# Patient Record
Sex: FEMALE | Race: WHITE | NOT HISPANIC OR LATINO | URBAN - METROPOLITAN AREA
[De-identification: names, ages, dates, MRNs, and addresses within clinical notes are randomized per-mention and may not be internally consistent; named-entity substitution may affect disease eponyms.]

---

## 2020-08-06 ENCOUNTER — NURSE TRIAGE (OUTPATIENT)
Dept: OTHER | Facility: OTHER | Age: 30
End: 2020-08-06

## 2020-08-06 DIAGNOSIS — Z20.822 ENCOUNTER FOR LABORATORY TESTING FOR SEVERE ACUTE RESPIRATORY SYNDROME CORONAVIRUS 2 (SARS-COV-2): Primary | ICD-10-CM

## 2020-08-06 DIAGNOSIS — Z20.822 ENCOUNTER FOR LABORATORY TESTING FOR SEVERE ACUTE RESPIRATORY SYNDROME CORONAVIRUS 2 (SARS-COV-2): ICD-10-CM

## 2020-08-06 PROCEDURE — U0003 INFECTIOUS AGENT DETECTION BY NUCLEIC ACID (DNA OR RNA); SEVERE ACUTE RESPIRATORY SYNDROME CORONAVIRUS 2 (SARS-COV-2) (CORONAVIRUS DISEASE [COVID-19]), AMPLIFIED PROBE TECHNIQUE, MAKING USE OF HIGH THROUGHPUT TECHNOLOGIES AS DESCRIBED BY CMS-2020-01-R: HCPCS | Performed by: FAMILY MEDICINE

## 2020-08-06 NOTE — TELEPHONE ENCOUNTER
Regarding: Covid concerns  ----- Message from Braden Mathew sent at 8/6/2020  9:53 AM EDT -----  " Patient is experiencing a low grade temp and a headache (no pcp)   "

## 2020-08-06 NOTE — TELEPHONE ENCOUNTER
Reason for Disposition   [1] COVID-19 infection suspected by caller or triager AND [2] mild symptoms (cough, fever, or others) AND [8] no complications or SOB    Additional Information   [1] Symptoms of COVID-19 (e g , cough, fever, SOB, or others) AND [2] lives in an area with community spread    Answer Assessment - Initial Assessment Questions  1  CLOSE CONTACT: "Who is the person with the confirmed or suspected COVID-19 infection that you were exposed to?"      Denied known exposure to COVID-19   6  TRAVEL: "Have you traveled out of the country recently?" If so, "When and where?"      * Also ask about out-of-state travel, since [facility] identified some high-risk cities for community spread in the [address]  * Note: Travel becomes less relevant if there is widespread community transmission where the patient lives  Denied  7  COMMUNITY SPREAD: "Are there lots of cases of COVID-19 (community spread) where you live?" (See public health department website, if unsure)        Lives in [place], [place]  8  SYMPTOMS: "Do you have any symptoms?" (e g , fever, cough, breathing difficulty)      Started on 8/2/2020  Headache  Temperature ranging from 99 0 -100 1 for 5 days; 98 5 (tympanic) today @ 0800  Myalgia  Fatigue  Denies cough or SOB  9  PREGNANCY OR POSTPARTUM: "Is there any chance you are pregnant?" "When was your last menstrual period?" "Did you deliver in the last 2 weeks?"      LMP 2 5 weeks ago  10  HIGH RISK: "Do you have any heart or lung problems?  Do you have a weak immune system?" (e g , CHF, COPD, asthma, HIV positive, chemotherapy, renal failure, diabetes mellitus, sickle cell anemia)        Denied    Protocols used: CORONAVIRUS (COVID-19) DIAGNOSED OR SUSPECTED-ADULT-[place], CORONAVIRUS (COVID-19) EXPOSURE-ADULT-[place]

## 2020-08-07 LAB — SARS-COV-2 RNA SPEC QL NAA+PROBE: NOT DETECTED

## 2020-10-26 ENCOUNTER — TRANSCRIBE ORDERS (OUTPATIENT)
Dept: ADMINISTRATIVE | Facility: HOSPITAL | Age: 30
End: 2020-10-26

## 2020-10-26 DIAGNOSIS — Z13.79 ENCOUNTER FOR OTHER SCREENING FOR GENETIC AND CHROMOSOMAL ANOMALIES: ICD-10-CM

## 2020-10-26 DIAGNOSIS — Z13.41 ENCOUNTER FOR AUTISM SCREENING: Primary | ICD-10-CM

## 2020-11-14 ENCOUNTER — HOSPITAL ENCOUNTER (OUTPATIENT)
Dept: RADIOLOGY | Facility: HOSPITAL | Age: 30
Discharge: HOME | End: 2020-11-14
Payer: COMMERCIAL

## 2020-11-14 DIAGNOSIS — Z13.79 ENCOUNTER FOR OTHER SCREENING FOR GENETIC AND CHROMOSOMAL ANOMALIES: ICD-10-CM

## 2020-11-14 DIAGNOSIS — Z13.41 ENCOUNTER FOR AUTISM SCREENING: ICD-10-CM

## 2020-11-14 PROCEDURE — G1004 CDSM NDSC: HCPCS

## 2020-11-14 PROCEDURE — 70551 MRI BRAIN STEM W/O DYE: CPT

## 2024-02-14 ENCOUNTER — OFFICE VISIT (OUTPATIENT)
Dept: URGENT CARE | Facility: CLINIC | Age: 34
End: 2024-02-14

## 2024-02-14 VITALS
OXYGEN SATURATION: 100 % | HEART RATE: 104 BPM | RESPIRATION RATE: 18 BRPM | TEMPERATURE: 98.1 F | SYSTOLIC BLOOD PRESSURE: 118 MMHG | DIASTOLIC BLOOD PRESSURE: 68 MMHG | WEIGHT: 135.6 LBS

## 2024-02-14 DIAGNOSIS — J06.9 UPPER RESPIRATORY TRACT INFECTION, UNSPECIFIED TYPE: Primary | ICD-10-CM

## 2024-02-14 LAB — S PYO DNA THROAT QL NAA+PROBE: NOT DETECTED

## 2024-02-14 PROCEDURE — 87636 SARSCOV2 & INF A&B AMP PRB: CPT | Performed by: PHYSICIAN ASSISTANT

## 2024-02-15 LAB
FLUAV RNA RESP QL NAA+PROBE: NEGATIVE
FLUBV RNA RESP QL NAA+PROBE: NEGATIVE
SARS-COV-2 RNA RESP QL NAA+PROBE: NEGATIVE

## 2024-02-15 NOTE — PROGRESS NOTES
[facility]        NAME: Jennifer Ryan is a 34 y.o. female  : 1990    MRN: [id number]  DATE: 2024  TIME: 7:18 PM    Assessment and Plan   Upper respiratory tract infection, unspecified type [J06.9]  1. Upper respiratory tract infection, unspecified type  POCT rapid PCR strepA    Covid19 and INFLUENZA A/B PCR        Encouraged to rest and stay hydrated.  May continue OTC meds for symptomatic relief. Discussed strict return to care precautions as well as red flag symptoms which should prompt immediate ED referral. Pt verbalized understanding and is in agreement with plan.  Please follow up with your primary care provider within the next week. Please remember that your visit today was with an urgent care provider and should not replace follow up with your primary care provider for chronic medical issues or annual physicals.   (Directly from CDC website)  IF YOU TEST POSITIVE FOR COVID-19:  If you have symptoms, you can end isolation after 5 full days if you are fever-free for 24 hours without the use of fever-reducing medication and your other symptoms have improved. Loss of taste and/or smell may persist for weeks or more and should not delay the end of isolation. Your first day of symptoms is DAY ZERO. You should continue to wear a well-fitting mask (like N95, KN95) both at home and in public for 5 additional days. Avoid people who are at high risk for severe disease for at least 10 days. DO NOT go to places where you are unable to wear a mask until a full 10 days from your first symptom.  If you have no symptoms, quarantine for 5 days from the day you were tested. The day you were tested is DAY ZERO. Continue wearing a mask around others until day 10. If you develop symptoms, your 5-day isolation period starts over.  If you have/had severe symptoms and/or a compromised immune system, you may need to isolate longer. Consult with your primary care provider about when you can resume  being around other people.    IF YOU HAVE HAD CLOSE EXPOSURE:  QUARANTINE IF: You are ages 18 or older and either have not been vaccinated, or have been vaccinated with your primary series but not the booster. You must quarantine for at least 5 days after your last contact. If you develop symptoms, get tested immediately. If you do not develop symptoms, get tested at least 5 days after your last exposure. Avoid people who are immunocompromised at high risk for severe disease until at least after 10 days.  YOU DO NOT HAVE TO QUARANTINE IF:   You are 18 years or older and have received all recommended vaccine doses, including boosters and additional primary shots for some immunocompromised people.  You are ages 5-17 and completed the primary series of COVID-19 vaccines.  You had confirmed COVID-19 within the last 90 days on a viral test.  You should still wear a well-fitting mask around others for 10 days from the date of your last close exposure to COVID-19, and get tested at least 5 days later.  Quarantine and isolation guidelines differ for healthcare professionals.      Patient Instructions       Follow up with PCP in 3-5 days.  Proceed to  ER if symptoms worsen.    Chief Complaint     Chief Complaint   Patient presents with    Earache     Cough, slight fever x 3 days.           History of Present Illness       Jennifer Ryan is a(n) 34 y.o. female presenting with URI symptoms x 3 days  Past medical history: denies  Congestion: yes  Sore throat: yes  Cough: yes  Sputum production: no  Fever: yes, tmax 100F  Body aches: yes  Loss of smell/taste: no  GI symptoms: no  Known sick contacts: yes, works at a school  OTC meds tried: tylenol, mucinex          Review of Systems   Review of Systems   Constitutional:         Negative except as noted in HPI   Respiratory:  Negative for shortness of breath.    Cardiovascular:  Negative for chest pain.         Current Medications     No current outpatient medications on  file.    Current Allergies     Allergies as of 2024    (No Known Allergies)            The following portions of the patient's history were reviewed and updated as appropriate: allergies, current medications, past family history, past medical history, past social history, past surgical history and problem list.     History reviewed. No pertinent past medical history.    Past Surgical History:   Procedure Laterality Date     SECTION N/A        History reviewed. No pertinent family history.      Medications have been verified.        Objective   /68   Pulse 104   Temp 98.1 °F (36.7 °C)   Resp 18   Wt 61.5 kg (135 lb 9.6 oz)   SpO2 100%        Physical Exam     Physical Exam  Vitals and nursing note reviewed.   Constitutional:       General: She is not in acute distress.     Appearance: Normal appearance. She is not toxic-appearing.   HENT:      Head: Normocephalic and atraumatic.      Right Ear: Tympanic membrane, ear canal and external ear normal.      Left Ear: Tympanic membrane, ear canal and external ear normal.      Nose: Congestion present.      Mouth/Throat:      Mouth: Mucous membranes are moist.      Pharynx: Oropharynx is clear. No oropharyngeal exudate or posterior oropharyngeal erythema.   Eyes:      Conjunctiva/sclera: Conjunctivae normal.      Pupils: Pupils are equal, round, and reactive to light.   Cardiovascular:      Rate and Rhythm: Normal rate and regular rhythm.      Heart sounds: Normal heart sounds.   Pulmonary:      Effort: Pulmonary effort is normal. No respiratory distress.      Breath sounds: Normal breath sounds. No wheezing, rhonchi or rales.   Musculoskeletal:      Cervical back: Normal range of motion and neck supple.   Skin:     General: Skin is warm and dry.      Capillary Refill: Capillary refill takes less than 2 seconds.   Neurological:      Mental Status: She is alert and oriented to person, place, and time.   Psychiatric:         Behavior: Behavior normal.

## 2024-02-18 ENCOUNTER — OFFICE VISIT (OUTPATIENT)
Dept: URGENT CARE | Facility: CLINIC | Age: 34
End: 2024-02-18
Payer: COMMERCIAL

## 2024-02-18 VITALS
SYSTOLIC BLOOD PRESSURE: 121 MMHG | HEART RATE: 94 BPM | WEIGHT: 135.8 LBS | RESPIRATION RATE: 18 BRPM | TEMPERATURE: 97 F | DIASTOLIC BLOOD PRESSURE: 74 MMHG | OXYGEN SATURATION: 100 %

## 2024-02-18 DIAGNOSIS — H69.93 DISORDER OF BOTH EUSTACHIAN TUBES: Primary | ICD-10-CM

## 2024-02-18 PROCEDURE — 99213 OFFICE O/P EST LOW 20 MIN: CPT

## 2024-02-18 NOTE — PATIENT INSTRUCTIONS
Start on flonase and antihistmaine such as allegra/claritin.   Go see ENT if lasts past 6 weeks.   Go to emergency room (ER) if you are getting worse.

## 2024-02-18 NOTE — PROGRESS NOTES
[facility]        NAME: Jennifer Ryan is a 34 y.o. female  : 1990    MRN: [id number]  DATE: 2024  TIME: 8:59 AM    Assessment and Plan   Disorder of both eustachian tubes [H69.93]  1. Disorder of both eustachian tubes          Symptoms consistent with eustachian tube dysfunction. Can consider starting on flonase/anthistmaine to help symptoms.  No clear infection to ears. No fever/chills, 10 days of being sick, sinus tenderness to indicate sinus infection at this time.   Follow up with primary care in 3-5 days.  Go to ER if symptoms get worse.     Patient Instructions     Start on flonase and antihistmaine such as allegra/claritin.   Go see ENT if lasts past 6 weeks.   Go to emergency room (ER) if you are getting worse.     Chief Complaint     Chief Complaint   Patient presents with    Earache     Bilateral ear congestion, pt states she feels like she is under water starting yesterday.          History of Present Illness       Presents with bilateral ear congestion/underwater sensation that began yesterday. She was sick within the last week, sore throat relieving. Continues to get thick mucous into her throat from her sinuses. Denies sinus pain/tenderness. No fever since 5 days ago.         Review of Systems   Review of Systems   Constitutional:  Negative for chills and fever.   HENT:  Positive for ear pain. Negative for congestion, sinus pressure, sinus pain and sore throat.    Respiratory:  Negative for cough and shortness of breath.    Cardiovascular:  Negative for chest pain.   Gastrointestinal:  Negative for abdominal pain.   Musculoskeletal:  Negative for myalgias.   Psychiatric/Behavioral:  Negative for confusion.          Current Medications     No current outpatient medications on file.    Current Allergies     Allergies as of 2024    (No Known Allergies)            The following portions of the patient's history were reviewed and updated as appropriate: allergies,  current medications, past family history, past medical history, past social history, past surgical history and problem list.     History reviewed. No pertinent past medical history.    Past Surgical History:   Procedure Laterality Date     SECTION N/A        History reviewed. No pertinent family history.      Medications have been verified.        Objective   /74   Pulse 94   Temp (!) 97 °F (36.1 °C)   Resp 18   Wt 61.6 kg (135 lb 12.8 oz)   SpO2 100%        Physical Exam     Physical Exam  Vitals reviewed.   Constitutional:       General: She is not in acute distress.     Appearance: Normal appearance.   HENT:      Right Ear: Tympanic membrane, ear canal and external ear normal.      Left Ear: Tympanic membrane, ear canal and external ear normal.      Nose: Nose normal.      Right Sinus: No maxillary sinus tenderness or frontal sinus tenderness.      Left Sinus: No maxillary sinus tenderness or frontal sinus tenderness.      Mouth/Throat:      Mouth: Mucous membranes are moist.      Pharynx: No posterior oropharyngeal erythema.   Eyes:      Conjunctiva/sclera: Conjunctivae normal.   Cardiovascular:      Rate and Rhythm: Normal rate and regular rhythm.      Pulses: Normal pulses.      Heart sounds: Normal heart sounds. No murmur heard.  Pulmonary:      Effort: Pulmonary effort is normal. No respiratory distress.      Breath sounds: Normal breath sounds.   Skin:     General: Skin is warm and dry.   Neurological:      General: No focal deficit present.      Mental Status: She is alert and oriented to person, place, and time.   Psychiatric:         Mood and Affect: Mood normal.         Behavior: Behavior normal.

## 2024-10-27 DIAGNOSIS — Z00.6 ENCOUNTER FOR EXAMINATION FOR NORMAL COMPARISON OR CONTROL IN CLINICAL RESEARCH PROGRAM: ICD-10-CM

## 2024-12-17 ENCOUNTER — OFFICE VISIT (OUTPATIENT)
Dept: URGENT CARE | Facility: CLINIC | Age: 34
End: 2024-12-17
Payer: COMMERCIAL

## 2024-12-17 VITALS
DIASTOLIC BLOOD PRESSURE: 75 MMHG | HEART RATE: 78 BPM | WEIGHT: 139.8 LBS | RESPIRATION RATE: 20 BRPM | SYSTOLIC BLOOD PRESSURE: 112 MMHG | OXYGEN SATURATION: 100 % | BODY MASS INDEX: 23.87 KG/M2 | TEMPERATURE: 97.4 F | HEIGHT: 64 IN

## 2024-12-17 DIAGNOSIS — B37.31 VAGINAL YEAST INFECTION: Primary | ICD-10-CM

## 2024-12-17 PROCEDURE — 99213 OFFICE O/P EST LOW 20 MIN: CPT | Performed by: PHYSICIAN ASSISTANT

## 2024-12-17 RX ORDER — FLUCONAZOLE 150 MG/1
150 TABLET ORAL ONCE
Qty: 2 TABLET | Refills: 0 | Status: SHIPPED | OUTPATIENT
Start: 2024-12-17 | End: 2024-12-17

## 2024-12-17 NOTE — PROGRESS NOTES
"  [facility]        NAME: Jennifer Ryan is a 34 y.o. female  : 1990    MRN: [id number]  DATE: 2024  TIME: 7:05 PM    Assessment and Plan   Vaginal yeast infection [B37.31]  1. Vaginal yeast infection  fluconazole (DIFLUCAN) 150 mg tablet    Ambulatory Referral to Obstetrics / Gynecology            Patient Instructions     Patient Instructions   Patient Education     Vulvovaginal yeast infection   The Basics   Written by the doctors and editors at UpToDate   What is a vulvovaginal yeast infection? -- This is an infection that causes itching and irritation of the vulva, the outer lips of the vagina (figure 1). The infection is usually caused by a fungus called \"Candida.\" (Yeast are a type of fungus.)  What causes yeast infections? -- The fungus that causes yeast infections normally lives in the vagina and the gut. Even though the yeast are there, they do not usually cause symptoms. Certain medicines (especially antibiotics), stress, and other things can cause the fungus to grow more than it should. When that happens, a yeast infection can start.  Your risk of getting a yeast infection is higher if you:   Have diabetes   Are pregnant   Have a weaker-than-normal immune system  Yeast infections are not usually spread through sex.  What are the symptoms of a yeast infection? -- Symptoms include:   Itching of the vulva (this is the most common symptom)   Pain, redness, or irritation of the vulva and vagina   Pain when you urinate   Pain during sex   Abnormal vaginal discharge, which might be thick and white or thin and watery  The symptoms of a yeast infection are a lot like the symptoms of many other conditions. The best way to find out what is causing your symptoms is to see your doctor or nurse. This way, you can get the right treatment.  Is there a test for yeast infection? -- Yes. Your doctor or nurse will use a swab to get a sample of fluid from your vagina. Then, they will put it under " a microscope and look for the fungus that causes yeast infections. Sometimes, they might do a test to find out which type of yeast you have.  Depending on your situation, your doctor or nurse might do other tests on your vaginal fluid, too. One common test checks for yeast infections as well as bacterial vaginosis and trichomoniasis. These are other infections that can also cause itching and irritation.  How are yeast infections treated? -- Yeast infections are treated with medicines. All medicines for yeast infections work by killing the fungus that causes the infections. They come in different forms:   A pill you take by mouth   Medicines you put in your vagina and on your vulva - These come as creams or tablets. This is the preferred treatment for pregnant people.  When will I feel better? -- You will probably feel better within a few days of starting treatment. If you do not get better after you finish treatment, see your doctor or nurse again. You might need to take more medicine or a different medicine.  What if I get yeast infections often? -- Tell your doctor or nurse. They can help find out whether your symptoms are caused by a yeast infection and, if so, which type of yeast. There are a few different types of yeast, and they respond to different treatments. Plus, the same symptoms that you get with a yeast infection can sometimes be caused by other types of infections, an allergy, or other problems. If you get frequent infections, you might need a different treatment than what you tried in the past.  When should I call the doctor? -- Call your doctor or nurse for advice if your symptoms do not get better after treatment. It might take up to a week for symptoms to improve.  All topics are updated as new evidence becomes available and our peer review process is complete.  This topic retrieved from [employer] on: Feb 26, 2024.  Topic 89281 Version 14.0  Release: 32.2.4 - C32.56  © 2024 [employer]Date, Inc[employer] and/or its  affiliates. All rights reserved.  figure 1: Adult female external genitalia     This drawing shows the different parts of the genitals.  Graphic 46204 Version 9.0  Consumer Information Use and Disclaimer   Disclaimer: This generalized information is a limited summary of diagnosis, treatment, and/or medication information. It is not meant to be comprehensive and should be used as a tool to help the user understand and/or assess potential diagnostic and treatment options. It does NOT include all information about conditions, treatments, medications, side effects, or risks that may apply to a specific patient. It is not intended to be medical advice or a substitute for the medical advice, diagnosis, or treatment of a health care provider based on the health care provider's examination and assessment of a patient's specific and unique circumstances. Patients must speak with a health care provider for complete information about their health, medical questions, and treatment options, including any risks or benefits regarding use of medications. This information does not endorse any treatments or medications as safe, effective, or approved for treating a specific patient. Up[employer]Date, Inc[employer] and its affiliates disclaim any warranty or liability relating to this information or the use thereof.The use of this information is governed by the Terms of Use, available at https://www.[employer].com/en/know/clinical-effectiveness-terms. 2024© UpToDate, Inc. and its affiliates and/or licensors. All rights reserved.  Copyright   © 2024 [employer]Date, Inc. and/or its affiliates. All rights reserved.        Follow up with PCP in 3-5 days.  Proceed to  ER if symptoms worsen.    Chief Complaint     Chief Complaint   Patient presents with    Vaginitis     Burning irritation light discharge         History of Present Illness       The patient is a 34-year-old female presenting today for vaginal burning and discharge. Reports that she had a yeast  infection about a month ago which presented the same.  She was seen by the doctor's then and was put on Diflucan which seemed to help.  She does not currently have a gynecologist.  Denies any fevers, chills, abdominal pain or hematuria.  Reports a whitish discharge.  Denies itching.        Review of Systems   Review of Systems   Constitutional:  Negative for activity change, appetite change, chills, fatigue and fever.   HENT:  Negative for congestion, ear pain, rhinorrhea, sinus pressure, sinus pain and sore throat.    Eyes:  Negative for pain and visual disturbance.   Respiratory:  Negative for cough, chest tightness and shortness of breath.    Cardiovascular:  Negative for chest pain and palpitations.   Gastrointestinal:  Negative for abdominal pain, diarrhea, nausea and vomiting.   Genitourinary:  Positive for vaginal discharge. Negative for decreased urine volume, difficulty urinating, dysuria, frequency, hematuria and vaginal pain.        Discharge    Musculoskeletal:  Negative for arthralgias, back pain and myalgias.   Skin:  Negative for color change, pallor and rash.   Neurological:  Negative for seizures, syncope and headaches.   All other systems reviewed and are negative.        Current Medications       Current Outpatient Medications:     fluconazole (DIFLUCAN) 150 mg tablet, Take 1 tablet (150 mg total) by mouth once for 1 dose Take 1 on day 1 and 1 on day 4, Disp: 2 tablet, Rfl: 0    Current Allergies     Allergies as of 2024    (No Known Allergies)            The following portions of the patient's history were reviewed and updated as appropriate: allergies, current medications, past family history, past medical history, past social history, past surgical history and problem list.     No past medical history on file.    Past Surgical History:   Procedure Laterality Date     SECTION N/A        No family history on file.      Medications have been verified.        Objective   /75    "Pulse 78   Temp (!) 97.4 °F (36.3 °C)   Resp 20   Ht 5' 4\" (1.626 m)   Wt 63.4 kg (139 lb 12.8 oz)   SpO2 100%   BMI 24.00 kg/m²        Physical Exam     Physical Exam  Vitals and nursing note reviewed.   Constitutional:       General: She is not in acute distress.     Appearance: Normal appearance. She is normal weight. She is not ill-appearing, toxic-appearing or diaphoretic.   HENT:      Head: Normocephalic and atraumatic.   Cardiovascular:      Rate and Rhythm: Normal rate and regular rhythm.      Heart sounds: Normal heart sounds. No murmur heard.     No friction rub. No gallop.   Pulmonary:      Effort: Pulmonary effort is normal. No respiratory distress.      Breath sounds: Normal breath sounds. No stridor. No wheezing, rhonchi or rales.   Chest:      Chest wall: No tenderness.   Abdominal:      Tenderness: There is no right CVA tenderness or left CVA tenderness.   Skin:     General: Skin is warm and dry.      Capillary Refill: Capillary refill takes less than 2 seconds.   Neurological:      Mental Status: She is alert.                   "

## 2024-12-18 NOTE — PATIENT INSTRUCTIONS
"Patient Education     Vulvovaginal yeast infection   The Basics   Written by the doctors and editors at [facility]   What is a vulvovaginal yeast infection? -- This is an infection that causes itching and irritation of the vulva, the outer lips of the vagina (figure 1). The infection is usually caused by a fungus called \"Candida.\" (Yeast are a type of fungus.)  What causes yeast infections? -- The fungus that causes yeast infections normally lives in the vagina and the gut. Even though the yeast are there, they do not usually cause symptoms. Certain medicines (especially antibiotics), stress, and other things can cause the fungus to grow more than it should. When that happens, a yeast infection can start.  Your risk of getting a yeast infection is higher if you:   Have diabetes   Are pregnant   Have a weaker-than-normal immune system  Yeast infections are not usually spread through sex.  What are the symptoms of a yeast infection? -- Symptoms include:   Itching of the vulva (this is the most common symptom)   Pain, redness, or irritation of the vulva and vagina   Pain when you urinate   Pain during sex   Abnormal vaginal discharge, which might be thick and white or thin and watery  The symptoms of a yeast infection are a lot like the symptoms of many other conditions. The best way to find out what is causing your symptoms is to see your doctor or nurse. This way, you can get the right treatment.  Is there a test for yeast infection? -- Yes. Your doctor or nurse will use a swab to get a sample of fluid from your vagina. Then, they will put it under a microscope and look for the fungus that causes yeast infections. Sometimes, they might do a test to find out which type of yeast you have.  Depending on your situation, your doctor or nurse might do other tests on your vaginal fluid, too. One common test checks for yeast infections as well as bacterial vaginosis and trichomoniasis. These are other infections that can also " cause itching and irritation.  How are yeast infections treated? -- Yeast infections are treated with medicines. All medicines for yeast infections work by killing the fungus that causes the infections. They come in different forms:   A pill you take by mouth   Medicines you put in your vagina and on your vulva - These come as creams or tablets. This is the preferred treatment for pregnant people.  When will I feel better? -- You will probably feel better within a few days of starting treatment. If you do not get better after you finish treatment, see your doctor or nurse again. You might need to take more medicine or a different medicine.  What if I get yeast infections often? -- Tell your doctor or nurse. They can help find out whether your symptoms are caused by a yeast infection and, if so, which type of yeast. There are a few different types of yeast, and they respond to different treatments. Plus, the same symptoms that you get with a yeast infection can sometimes be caused by other types of infections, an allergy, or other problems. If you get frequent infections, you might need a different treatment than what you tried in the past.  When should I call the doctor? -- Call your doctor or nurse for advice if your symptoms do not get better after treatment. It might take up to a week for symptoms to improve.  All topics are updated as new evidence becomes available and our peer review process is complete.  This topic retrieved from [employer] on: Feb 26, 2024.  Topic 37509 Version 14.0  Release: 32.2.4 - C32.56  © 2024 UpToDate, Inc. and/or its affiliates. All rights reserved.  figure 1: Adult female external genitalia     This drawing shows the different parts of the genitals.  Graphic 31947 Version 9.0  Consumer Information Use and Disclaimer   Disclaimer: This generalized information is a limited summary of diagnosis, treatment, and/or medication information. It is not meant to be comprehensive and should be  used as a tool to help the user understand and/or assess potential diagnostic and treatment options. It does NOT include all information about conditions, treatments, medications, side effects, or risks that may apply to a specific patient. It is not intended to be medical advice or a substitute for the medical advice, diagnosis, or treatment of a health care provider based on the health care provider's examination and assessment of a patient's specific and unique circumstances. Patients must speak with a health care provider for complete information about their health, medical questions, and treatment options, including any risks or benefits regarding use of medications. This information does not endorse any treatments or medications as safe, effective, or approved for treating a specific patient. UpToDate, Inc. and its affiliates disclaim any warranty or liability relating to this information or the use thereof.The use of this information is governed by the Terms of Use, available at https://www.[employer].com/en/know/clinical-effectiveness-terms. 2024© UpToDate, Inc. and its affiliates and/or licensors. All rights reserved.  Copyright   © 2024 UpToDate, Inc. and/or its affiliates. All rights reserved.

## 2025-02-25 ENCOUNTER — OFFICE VISIT (OUTPATIENT)
Dept: OBGYN CLINIC | Facility: CLINIC | Age: 35
End: 2025-02-25
Payer: COMMERCIAL

## 2025-02-25 VITALS
DIASTOLIC BLOOD PRESSURE: 78 MMHG | SYSTOLIC BLOOD PRESSURE: 110 MMHG | WEIGHT: 141 LBS | BODY MASS INDEX: 24.07 KG/M2 | HEIGHT: 64 IN

## 2025-02-25 DIAGNOSIS — Z01.419 WOMEN'S ANNUAL ROUTINE GYNECOLOGICAL EXAMINATION: Primary | ICD-10-CM

## 2025-02-25 DIAGNOSIS — N94.12 DEEP DYSPAREUNIA: ICD-10-CM

## 2025-02-25 DIAGNOSIS — B37.31 VAGINAL YEAST INFECTION: ICD-10-CM

## 2025-02-25 PROCEDURE — 99385 PREV VISIT NEW AGE 18-39: CPT | Performed by: NURSE PRACTITIONER

## 2025-02-25 NOTE — PROGRESS NOTES
"  Subjective    HPI:     Jennifer Ryan is a 35 y.o. female. She is a  2 Para 2, with a  and C/S. Her menstrual cycles are about 28-30 days, lasting 6-7 days. Her current method of contraception includes  tubal ligation . In a stable monogamous relationship for 16 years. She has been experiencing some pain with intimacy on the right vaginal wall. She also has lower back pain on the right. She denies /GI complaints. She has noticed over the past 3 years more frequent yeast infections, about 3 year. She feels safe at home. She  has  depression/anxiety, has been self managing. Medical, surgical and family history reviewed. Her dental care is not done. She tries to eat a healthy diet and she gets her steps in daily. She is happy with her weight.     Visit Vitals  /78 (BP Location: Right arm, Patient Position: Sitting, Cuff Size: Standard)   Ht 5' 4\" (1.626 m)   Wt 64 kg (141 lb)   BMI 24.20 kg/m²   OB Status Having periods   Smoking Status Never   BSA 1.69 m²       Gynecologic History    Patient's last menstrual period was 2025.    Gardasil Vaccine Series: unvaccinated   Last Pap: 5 yrs ago - normal pap smear. States she has always had normal pap smear screenings.     Obstetric and Medical History    OB History    Para Term  AB Living   2 2 2   2   SAB IAB Ectopic Multiple Live Births       2      # Outcome Date GA Lbr Roney/2nd Weight Sex Type Anes PTL Lv   2 Term 01/28/15    M CS-Unspec   ROSAURA   1 Term 12    M Vag-Spont   ROSAURA       Past Medical History:   Diagnosis Date    Migraine     Varicella , 10 years old       Past Surgical History:   Procedure Laterality Date     SECTION N/A        The following portions of the patient's history were reviewed and updated as appropriate: allergies, current medications, past family history, past medical history, past social history, past surgical history, and problem list.    Review of Systems    Pertinent items are noted in " HPI.      Objective    Physical Exam  Constitutional:       Appearance: Normal appearance. She is well-developed.   Genitourinary:      Vulva, bladder and urethral meatus normal.      No lesions in the vagina.      Right Labia: No rash, tenderness, lesions, skin changes or Bartholin's cyst.     Left Labia: No tenderness, lesions, skin changes, Bartholin's cyst or rash.     No labial fusion noted.      No inguinal adenopathy present in the right or left side.     No vaginal discharge, erythema, tenderness, bleeding or granulation tissue.      No vaginal prolapse present.     No vaginal atrophy present.       Right Adnexa: not tender, not full and no mass present.     Left Adnexa: not tender, not full and no mass present.     Cervix is parous.      No cervical motion tenderness, discharge, friability, lesion, polyp or nabothian cyst.      Uterus is not enlarged, tender, irregular or prolapsed.      No uterine mass detected.     Uterus is anteverted.      Obturator internus is tender.      Pelvic exam was performed with patient in the lithotomy position.   Breasts:     Breasts are symmetrical.      Right: No inverted nipple, mass, nipple discharge, skin change or tenderness.      Left: No inverted nipple, mass, nipple discharge, skin change or tenderness.   HENT:      Head: Normocephalic and atraumatic.   Neck:      Thyroid: No thyromegaly.   Cardiovascular:      Rate and Rhythm: Normal rate and regular rhythm.      Heart sounds: Normal heart sounds, S1 normal and S2 normal.   Pulmonary:      Effort: Pulmonary effort is normal.      Breath sounds: Normal breath sounds.   Abdominal:      General: Bowel sounds are normal. There is no distension.      Palpations: Abdomen is soft. There is no mass.      Tenderness: There is no abdominal tenderness. There is no guarding.      Hernia: There is no hernia in the left inguinal area or right inguinal area.   Musculoskeletal:      Cervical back: Neck supple.   Lymphadenopathy:       Cervical: No cervical adenopathy.      Upper Body:      Right upper body: No supraclavicular or axillary adenopathy.      Left upper body: No supraclavicular or axillary adenopathy.      Lower Body: No right inguinal adenopathy. No left inguinal adenopathy.   Neurological:      Mental Status: She is alert.   Skin:     General: Skin is warm and dry.      Findings: No rash.   Psychiatric:         Attention and Perception: Attention and perception normal.         Mood and Affect: Mood and affect normal.         Speech: Speech normal.         Behavior: Behavior is cooperative.         Thought Content: Thought content normal.         Cognition and Memory: Cognition and memory normal.         Judgment: Judgment normal.   Vitals and nursing note reviewed.        Assessment and Plan    Diagnoses and all orders for this visit:    Women's annual routine gynecological examination    Vaginal yeast infection  -     Ambulatory Referral to Obstetrics / Gynecology      Patient informed of a Stable GYN exam with exception of discomfort elicited with bimanual exam in the area of the obturator internus. Offered pelvic floor PT referral - declined at this time. Encouraged her to research pelvic floor exercises to help release the muscle.    A pap smear was performed.     I have discussed the importance of exercise and healthy diet as well as adequate intake of calcium and vitamin D. The current ASCCP guidelines were reviewed. The low risk patient will receive pap smear screening every 3 years until the age of 29 and then every 3 to 5 years with HPV co-testing from the ages of 30-65. I emphasized the importance of an annual pelvic and breast exam. A yearly mammogram is recommended for breast cancer screening starting at age 40.     Results will be released to [facility], if abnormal will call to review and discuss treatment plan.     All questions have been answered to her satisfaction.       Contraception: tubal ligation.  Follow up in:  1 tubal ligation.

## 2025-03-04 ENCOUNTER — OFFICE VISIT (OUTPATIENT)
Dept: URGENT CARE | Facility: CLINIC | Age: 35
End: 2025-03-04
Payer: COMMERCIAL

## 2025-03-04 VITALS
HEART RATE: 115 BPM | DIASTOLIC BLOOD PRESSURE: 68 MMHG | RESPIRATION RATE: 20 BRPM | TEMPERATURE: 97.6 F | HEIGHT: 64 IN | OXYGEN SATURATION: 99 % | SYSTOLIC BLOOD PRESSURE: 121 MMHG | BODY MASS INDEX: 23.9 KG/M2 | WEIGHT: 140 LBS

## 2025-03-04 DIAGNOSIS — R68.89 FLU-LIKE SYMPTOMS: Primary | ICD-10-CM

## 2025-03-04 PROCEDURE — 87636 SARSCOV2 & INF A&B AMP PRB: CPT | Performed by: PHYSICIAN ASSISTANT

## 2025-03-04 PROCEDURE — 99213 OFFICE O/P EST LOW 20 MIN: CPT | Performed by: PHYSICIAN ASSISTANT

## 2025-03-04 RX ORDER — ALBUTEROL SULFATE 90 UG/1
2 INHALANT RESPIRATORY (INHALATION) EVERY 6 HOURS PRN
Qty: 8 G | Refills: 0 | Status: SHIPPED | OUTPATIENT
Start: 2025-03-04

## 2025-03-04 RX ORDER — PREDNISONE 10 MG/1
40 TABLET ORAL DAILY
Qty: 16 TABLET | Refills: 0 | Status: SHIPPED | OUTPATIENT
Start: 2025-03-04 | End: 2025-03-08

## 2025-03-04 RX ORDER — BROMPHENIRAMINE MALEATE, PSEUDOEPHEDRINE HYDROCHLORIDE, AND DEXTROMETHORPHAN HYDROBROMIDE 2; 30; 10 MG/5ML; MG/5ML; MG/5ML
10 SYRUP ORAL 4 TIMES DAILY PRN
Qty: 120 ML | Refills: 0 | Status: SHIPPED | OUTPATIENT
Start: 2025-03-04

## 2025-03-04 NOTE — PATIENT INSTRUCTIONS
"Patient Education     Flu in adults - Discharge instructions   The Basics   Written by the doctors and editors at [facility]   What are discharge instructions? -- Discharge instructions are information about how to take care of yourself after getting medical care for a health problem.  What is the flu? -- This is an infection that can cause fever, cough, body aches, and other symptoms. The most common type of flu is the \"seasonal\" flu. There are different forms of seasonal flu, for example, \"type A\" and \"type B.\" The medical term for the flu is \"influenza.\"  All forms of the flu are caused by viruses. Antibiotics do not work to treat the flu. Doctors might prescribe you an \"antiviral\" medicine. If so, follow your doctor's instructions. The flu can be dangerous because it can cause a serious lung infection called pneumonia.  How do I care for myself at home? -- Ask the doctor or nurse what you should do when you go home. Make sure that you understand exactly what you need to do to care for yourself. Ask questions if there is anything you do not understand.  You should also:   Take all of your medicines as instructed, even if you are feeling better.   Get lots of rest. Sleep when you feel tired. Avoid doing tiring activities.   Take warm, steamy showers to help soothe your cough.   Use hard candy or cough drops to soothe a sore throat and cough.   Try to thin mucus:   Drink lots of liquids.   Use a cool mist humidifier, if your doctor told you to. Keep the humidifier clean.   Use saline nose drops to relieve stuffiness.   Take a medicine like acetaminophen (sample brand name: Tylenol) or ibuprofen (sample brand names: Advil, Motrin) to help bring down your fever.   Dress in lightweight clothes if you have a fever. Cover with a light sheet or blanket if needed. This will help keep you from getting too warm.   Lower the chance of passing the infection to others:   Stay home while you are feeling sick or have a fever.   At " home, try to limit close contact with other people. You can also help protect others by wearing a face mask.   Wash your hands often (figure 1). Alcohol-based hand sanitizers also work to kill germs.   Cover your mouth and nose with the inside of your elbow when you cough or sneeze.   Avoid touching your eyes, nose, and mouth.   Do not share cups, food, towels, bedding, or other personal items with others.   Clean items and surfaces you often touch. Examples include sinks, counters, tables, door handles, remotes, and phones. Germs can often live on surfaces for a few hours. Use a bleach and water mixture or a cleaning product that gets rid of viruses.   Do not go to work or school until your symptoms are improving and your fever has been gone for at least 24 hours without taking medicine such as acetaminophen.  It's also important to get a flu vaccine each year. Some years, the flu vaccine is more effective than others. But even in years when it is less effective, it still helps prevent some cases of the flu. It can also help keep you from getting severely ill if you do get the flu.  What follow-up care do I need? -- Your doctor or nurse will tell you if you need to make a follow-up appointment. If so, make sure that you know when and where to go.  When should I call the doctor? -- Call for emergency help right away (in the [place] and [place], call 9-1-1) if you:   Are having so much trouble breathing that you can only say 1 or 2 words at a time   Need to sit upright at all times to be able to breathe, or cannot lie down   Are very tired from working to catch your breath, or are sweating from trying to breathe  Call for advice if you:   Have trouble breathing when talking or sitting still   Have severe chest discomfort   Feel confused or disoriented   Are vomiting and can't keep liquids down   Have early signs of fluid loss, such as:   Dark-colored urine   Dry mouth   Muscle cramps   Lack of energy   Feeling lightheaded  when you stand up  All topics are updated as new evidence becomes available and our peer review process is complete.  This topic retrieved from [employer] on: Apr 27, 2024.  Topic 382592 Version 2.0  Release: 32.4.2 - C32.116  © 2024 UpToDate, Inc. and/or its affiliates. All rights reserved.  figure 1: How to wash your hands     Wet your hands with clean water, and apply a small amount of soap. Lather and rub hands together for at least 20 seconds. Clean your wrists, palms, backs of your hands, between your fingers, tips of your fingers, thumbs, and under and around your nails. Rinse well, and dry your hands using a clean towel.  Graphic 895378 Version 7.0  Consumer Information Use and Disclaimer   Disclaimer: This generalized information is a limited summary of diagnosis, treatment, and/or medication information. It is not meant to be comprehensive and should be used as a tool to help the user understand and/or assess potential diagnostic and treatment options. It does NOT include all information about conditions, treatments, medications, side effects, or risks that may apply to a specific patient. It is not intended to be medical advice or a substitute for the medical advice, diagnosis, or treatment of a health care provider based on the health care provider's examination and assessment of a patient's specific and unique circumstances. Patients must speak with a health care provider for complete information about their health, medical questions, and treatment options, including any risks or benefits regarding use of medications. This information does not endorse any treatments or medications as safe, effective, or approved for treating a specific patient. UpToDate, Inc. and its affiliates disclaim any warranty or liability relating to this information or the use thereof.The use of this information is governed by the Terms of Use, available at https://www.[employer].com/en/know/clinical-effectiveness-terms. 2024©  [employer] and its affiliates and/or licensors. All rights reserved.  Copyright   © 2024 [employer] and/or its affiliates. All rights reserved.

## 2025-03-04 NOTE — PROGRESS NOTES
"  [facility]        NAME: Jennifer Ryan is a 35 y.o. female  : 1990    MRN: [id number]  DATE: 2025  TIME: 9:38 AM    Assessment and Plan   Flu-like symptoms [R68.89]  1. Flu-like symptoms  Covid19 and INFLUENZA A/B PCR    brompheniramine-pseudoephedrine-DM 30-2-10 MG/5ML syrup    predniSONE 10 mg tablet    albuterol (PROVENTIL HFA,VENTOLIN HFA) 90 mcg/act inhaler        If COVID/flu negative and she is still symptomatic on Thursday she will call the office back and I will send in azithromycin.        Patient Instructions     Patient Instructions   Patient Education     Flu in adults - Discharge instructions   The Basics   Written by the doctors and editors at [facility]   What are discharge instructions? -- Discharge instructions are information about how to take care of yourself after getting medical care for a health problem.  What is the flu? -- This is an infection that can cause fever, cough, body aches, and other symptoms. The most common type of flu is the \"seasonal\" flu. There are different forms of seasonal flu, for example, \"type A\" and \"type B.\" The medical term for the flu is \"influenza.\"  All forms of the flu are caused by viruses. Antibiotics do not work to treat the flu. Doctors might prescribe you an \"antiviral\" medicine. If so, follow your doctor's instructions. The flu can be dangerous because it can cause a serious lung infection called pneumonia.  How do I care for myself at home? -- Ask the doctor or nurse what you should do when you go home. Make sure that you understand exactly what you need to do to care for yourself. Ask questions if there is anything you do not understand.  You should also:   Take all of your medicines as instructed, even if you are feeling better.   Get lots of rest. Sleep when you feel tired. Avoid doing tiring activities.   Take warm, steamy showers to help soothe your cough.   Use hard candy or cough drops to soothe a sore throat and cough.   " Try to thin mucus:   Drink lots of liquids.   Use a cool mist humidifier, if your doctor told you to. Keep the humidifier clean.   Use saline nose drops to relieve stuffiness.   Take a medicine like acetaminophen (sample brand name: Tylenol) or ibuprofen (sample brand names: Advil, Motrin) to help bring down your fever.   Dress in lightweight clothes if you have a fever. Cover with a light sheet or blanket if needed. This will help keep you from getting too warm.   Lower the chance of passing the infection to others:   Stay home while you are feeling sick or have a fever.   At home, try to limit close contact with other people. You can also help protect others by wearing a face mask.   Wash your hands often (figure 1). Alcohol-based hand sanitizers also work to kill germs.   Cover your mouth and nose with the inside of your elbow when you cough or sneeze.   Avoid touching your eyes, nose, and mouth.   Do not share cups, food, towels, bedding, or other personal items with others.   Clean items and surfaces you often touch. Examples include sinks, counters, tables, door handles, remotes, and phones. Germs can often live on surfaces for a few hours. Use a bleach and water mixture or a cleaning product that gets rid of viruses.   Do not go to work or school until your symptoms are improving and your fever has been gone for at least 24 hours without taking medicine such as acetaminophen.  It's also important to get a flu vaccine each year. Some years, the flu vaccine is more effective than others. But even in years when it is less effective, it still helps prevent some cases of the flu. It can also help keep you from getting severely ill if you do get the flu.  What follow-up care do I need? -- Your doctor or nurse will tell you if you need to make a follow-up appointment. If so, make sure that you know when and where to go.  When should I call the doctor? -- Call for emergency help right away (in the [place] and [place], call  9-1-1) if you:   Are having so much trouble breathing that you can only say 1 or 2 words at a time   Need to sit upright at all times to be able to breathe, or cannot lie down   Are very tired from working to catch your breath, or are sweating from trying to breathe  Call for advice if you:   Have trouble breathing when talking or sitting still   Have severe chest discomfort   Feel confused or disoriented   Are vomiting and can't keep liquids down   Have early signs of fluid loss, such as:   Dark-colored urine   Dry mouth   Muscle cramps   Lack of energy   Feeling lightheaded when you stand up  All topics are updated as new evidence becomes available and our peer review process is complete.  This topic retrieved from [employer] on: Apr 27, 2024.  Topic 858930 Version 2.0  Release: 32.4.2 - C32.116  © 2024 UpToDate, Inc. and/or its affiliates. All rights reserved.  figure 1: How to wash your hands     Wet your hands with clean water, and apply a small amount of soap. Lather and rub hands together for at least 20 seconds. Clean your wrists, palms, backs of your hands, between your fingers, tips of your fingers, thumbs, and under and around your nails. Rinse well, and dry your hands using a clean towel.  Graphic 836266 Version 7.0  Consumer Information Use and Disclaimer   Disclaimer: This generalized information is a limited summary of diagnosis, treatment, and/or medication information. It is not meant to be comprehensive and should be used as a tool to help the user understand and/or assess potential diagnostic and treatment options. It does NOT include all information about conditions, treatments, medications, side effects, or risks that may apply to a specific patient. It is not intended to be medical advice or a substitute for the medical advice, diagnosis, or treatment of a health care provider based on the health care provider's examination and assessment of a patient's specific and unique circumstances. Patients  must speak with a health care provider for complete information about their health, medical questions, and treatment options, including any risks or benefits regarding use of medications. This information does not endorse any treatments or medications as safe, effective, or approved for treating a specific patient. UpToDate, Inc. and its affiliates disclaim any warranty or liability relating to this information or the use thereof.The use of this information is governed by the Terms of Use, available at https://www.[employer].com/en/know/clinical-effectiveness-terms. 2024© UpToDate, Inc. and its affiliates and/or licensors. All rights reserved.  Copyright   © 2024 UpToDate, Inc. and/or its affiliates. All rights reserved.        Follow up with PCP in 3-5 days.  Proceed to  ER if symptoms worsen.    Chief Complaint     Chief Complaint   Patient presents with    flu like symptoms     Cough sore throat, chest burning with cough, fever x 5 days 101.5. body  aches         History of Present Illness       The patient is a 35-year-old female presenting today for cough, sore throat, chest burning, fever and myalgias x 5 days.  Tmax of 101.5 °F.  She did have a fever overnight.  Reports that the cough and chest burning seemed to start yesterday.  Is concerned she may have developed bronchitis.        Review of Systems   Review of Systems   Constitutional:  Positive for fever. Negative for activity change, appetite change, chills and fatigue.   HENT:  Positive for sore throat. Negative for congestion, rhinorrhea, sinus pressure and sinus pain.    Respiratory:  Positive for cough and chest tightness (burning). Negative for shortness of breath.    Cardiovascular:  Negative for chest pain and palpitations.   Gastrointestinal:  Negative for diarrhea, nausea and vomiting.   Musculoskeletal:  Positive for myalgias. Negative for arthralgias.   Skin:  Negative for color change and pallor.   Neurological:  Negative for headaches.  "        Current Medications       Current Outpatient Medications:     albuterol (PROVENTIL HFA,VENTOLIN HFA) 90 mcg/act inhaler, Inhale 2 puffs every 6 (six) hours as needed for wheezing, Disp: 8 g, Rfl: 0    brompheniramine-pseudoephedrine-DM 30-2-10 MG/5ML syrup, Take 10 mL by mouth 4 (four) times a day as needed for congestion or cough, Disp: 120 mL, Rfl: 0    predniSONE 10 mg tablet, Take 4 tablets (40 mg total) by mouth daily for 4 days, Disp: 16 tablet, Rfl: 0    Current Allergies     Allergies as of 2025    (No Known Allergies)            The following portions of the patient's history were reviewed and updated as appropriate: allergies, current medications, past family history, past medical history, past social history, past surgical history and problem list.     Past Medical History:   Diagnosis Date    Migraine     Varicella , 10 years old       Past Surgical History:   Procedure Laterality Date     SECTION N/A     TUBAL LIGATION         Family History   Problem Relation Age of Onset    Cancer Mother         Hodgkin's Lymphoma    Heart disease Mother         Dilated cardiomyopathy, SVT,  VT with syncope    Migraines Mother     Stroke Mother         CVA, left frontal gyrus    Supraventricular tachycardia Mother     Brain Aneurysm Mother     Hamida-Danlos syndrome Mother     Raynaud syndrome Mother     Hypertension Father     Asthma Sister     Migraines Sister     Other (Other) Brother         cyst disorder    Asthma Son     Rashes / Skin problems Son     Seizures Son          Medications have been verified.        Objective   /68   Pulse (!) 115   Temp 97.6 °F (36.4 °C)   Resp 20   Ht 5' 4\" (1.626 m)   Wt 63.5 kg (140 lb)   LMP 2025   SpO2 99%   BMI 24.03 kg/m²        Physical Exam     Physical Exam  Vitals reviewed.   Constitutional:       General: She is not in acute distress.     Appearance: Normal appearance. She is well-developed and normal weight. She is not " ill-appearing, toxic-appearing or diaphoretic.   HENT:      Head: Normocephalic and atraumatic.      Right Ear: Tympanic membrane, ear canal and external ear normal. No drainage, swelling or tenderness. No middle ear effusion. There is no impacted cerumen. Tympanic membrane is not erythematous.      Left Ear: Tympanic membrane, ear canal and external ear normal. No drainage, swelling or tenderness.  No middle ear effusion. There is no impacted cerumen. Tympanic membrane is not erythematous.      Nose: Nose normal. No congestion or rhinorrhea.      Mouth/Throat:      Mouth: Mucous membranes are moist. No oral lesions.      Pharynx: Oropharynx is clear. Uvula midline. No pharyngeal swelling, oropharyngeal exudate, posterior oropharyngeal erythema or uvula swelling.      Tonsils: No tonsillar exudate or tonsillar abscesses. 0 on the right. 0 on the left.   Eyes:      Extraocular Movements:      Right eye: Normal extraocular motion.      Left eye: Normal extraocular motion.      Conjunctiva/sclera: Conjunctivae normal.      Pupils: Pupils are equal, round, and reactive to light.   Cardiovascular:      Rate and Rhythm: Normal rate and regular rhythm.      Heart sounds: Normal heart sounds. No murmur heard.     No friction rub.   Pulmonary:      Effort: Pulmonary effort is normal. No respiratory distress.      Breath sounds: Normal breath sounds. No stridor. No wheezing, rhonchi or rales.      Comments: Coarse breath sounds   Chest:      Chest wall: No tenderness.   Abdominal:      General: Abdomen is flat. Bowel sounds are normal. There is no distension.      Palpations: Abdomen is soft.      Tenderness: There is no abdominal tenderness. There is no guarding.   Musculoskeletal:         General: Normal range of motion.   Skin:     General: Skin is warm and dry.      Capillary Refill: Capillary refill takes less than 2 seconds.   Neurological:      Mental Status: She is alert.

## 2025-03-04 NOTE — LETTER
March 4, 2025     Patient: Jennifer Ryan  YOB: 1990  Date of Visit: 3/4/2025      To Whom it May Concern:    Jennifer Ryan is under my professional care. Jennifer was seen in my office on 3/4/2025. Jennifer return to work when she is 24 hours fever free.    If you have any questions or concerns, please don't hesitate to call.         Sincerely,          Mahi Deluca PA-C        CC: No Recipients

## 2025-03-05 LAB
FLUAV RNA RESP QL NAA+PROBE: NEGATIVE
FLUBV RNA RESP QL NAA+PROBE: POSITIVE
SARS-COV-2 RNA RESP QL NAA+PROBE: NEGATIVE

## 2025-03-06 ENCOUNTER — TELEPHONE (OUTPATIENT)
Dept: URGENT CARE | Facility: CLINIC | Age: 35
End: 2025-03-06

## 2025-03-06 DIAGNOSIS — J01.00 ACUTE NON-RECURRENT MAXILLARY SINUSITIS: Primary | ICD-10-CM

## 2025-03-06 NOTE — TELEPHONE ENCOUNTER
The patient is calling in reporting increasing sinus pain and pressure.  Reports feeling dizzy when she stands up.  She was seen in the office on 3/4/2025 and tested positive for FLU B.  Reports that the majority of her symptoms have resolved.  Reports that the chest congestion has resolved.  She is wondering if she could have developed a secondary infection.  Augmentin twice daily 7 days sent to the patient's pharmacy.  She is currently on day 7 of symptoms.

## 2025-04-07 ENCOUNTER — TELEPHONE (OUTPATIENT)
Age: 35
End: 2025-04-07

## 2025-04-07 NOTE — TELEPHONE ENCOUNTER
"Pt was seen in office by Trinidad Garsia for her yearly exam on 2/25/25. Pt communicated never receiving pap results. Checked providers note to ensure pap was completed, note states \"A pap smear was performed\". Please f/u with pt, as pap does not appear under labs. Thank you.  "

## 2025-04-08 NOTE — TELEPHONE ENCOUNTER
Advised pt that unfortunately I do not see that there was an order placed for her pap and may have been discarded after her exam by mistake. Advised we can bring her in for a repap and there would be no charge for the office visit.  She states she is available this Friday after 11:30am, and then 4/17-4/23 as she is on spring break.  On 4/23 she is available anytime.

## 2025-04-09 NOTE — TELEPHONE ENCOUNTER
Spoke with pt and made aware was able to get her scheduled for 4/21/2025 at 11:15am.  Patient states this appointment works for her.

## 2025-04-21 ENCOUNTER — OFFICE VISIT (OUTPATIENT)
Dept: OBGYN CLINIC | Facility: CLINIC | Age: 35
End: 2025-04-21

## 2025-04-21 VITALS
WEIGHT: 138 LBS | BODY MASS INDEX: 23.56 KG/M2 | HEIGHT: 64 IN | SYSTOLIC BLOOD PRESSURE: 100 MMHG | DIASTOLIC BLOOD PRESSURE: 66 MMHG

## 2025-04-21 DIAGNOSIS — Z12.4 CERVICAL CANCER SCREENING: ICD-10-CM

## 2025-04-21 DIAGNOSIS — Z01.419 ENCOUNTER FOR CERVICAL PAP SMEAR WITH PELVIC EXAM: Primary | ICD-10-CM

## 2025-04-21 PROCEDURE — REPAP: Performed by: STUDENT IN AN ORGANIZED HEALTH CARE EDUCATION/TRAINING PROGRAM

## 2025-04-21 NOTE — PROGRESS NOTES
"OB/GYN VISIT  Jennifer Ryan  2025  11:29 AM    ASSESSMENT / PLAN:    Jeninfer Ryan is a 35 y.o.  female presenting for repeat pap smear due to error with prior one performed.    Encounter for screening pap smear:  - Repeat pap smear performed given error with prior one performed    SUBJECTIVE:    Jennifer Ryan is a 35 y.o.  female presenting for repeat pap smear due to error with prior one performed.  She had a pap smear collected on 25, but the specimen was never processed.  Today, she presents for re-collection.  She feels well today and reports no questions or concerns and no changes in her health since she was last seen.  Repeat pap smear was collected without difficulty.    Past Medical History:   Diagnosis Date    Migraine     Varicella , 10 years old       Past Surgical History:   Procedure Laterality Date     SECTION N/A     TUBAL LIGATION         OBJECTIVE:    Vitals:  Blood pressure 100/66, height 5' 4\" (1.626 m), weight 62.6 kg (138 lb), last menstrual period 2025.Body mass index is 23.69 kg/m².    Physical Exam:    Physical Exam  Constitutional:       General: She is not in acute distress.     Appearance: Normal appearance. She is not ill-appearing.   Genitourinary:      Urethral meatus normal.      No lesions in the vagina.      Right Labia: No rash, tenderness, lesions, skin changes or Bartholin's cyst.     Left Labia: No tenderness, lesions, skin changes, Bartholin's cyst or rash.     No labial fusion noted.      Pelvic Pablo Score: 5/5.     No vaginal discharge, erythema, tenderness, bleeding, ulceration or granulation tissue.      No vaginal atrophy present.     Cervix is parous.      No cervical discharge, friability, lesion, polyp, nabothian cyst, eversion or elongation.   HENT:      Mouth/Throat:      Mouth: Mucous membranes are moist.   Eyes:      Extraocular Movements: Extraocular movements intact.   Cardiovascular:      Rate and Rhythm: Normal rate. "   Pulmonary:      Effort: Pulmonary effort is normal.   Musculoskeletal:         General: No swelling or tenderness.      Right lower leg: No edema.      Left lower leg: No edema.   Neurological:      General: No focal deficit present.      Mental Status: She is alert.   Skin:     General: Skin is warm and dry.      Coloration: Skin is not jaundiced or pale.   Psychiatric:         Mood and Affect: Mood normal.         Behavior: Behavior normal.         Thought Content: Thought content normal.         Judgment: Judgment normal.             Rose Trinidad MD  4/21/2025  11:29 AM

## 2025-04-23 LAB
CYTOLOGIST CVX/VAG CYTO: NORMAL
DX ICD CODE: NORMAL
HPV GENOTYPE REFLEX: NORMAL
HPV I/H RISK 4 DNA CVX QL PROBE+SIG AMP: NEGATIVE
OTHER STN SPEC: NORMAL
PATH REPORT.FINAL DX SPEC: NORMAL
SL AMB NOTE:: NORMAL
SL AMB SPECIMEN ADEQUACY: NORMAL
SL AMB TEST METHODOLOGY: NORMAL

## 2025-04-27 ENCOUNTER — RESULTS FOLLOW-UP (OUTPATIENT)
Dept: OBGYN CLINIC | Facility: CLINIC | Age: 35
End: 2025-04-27

## 2025-05-23 ENCOUNTER — OFFICE VISIT (OUTPATIENT)
Dept: FAMILY MEDICINE CLINIC | Facility: CLINIC | Age: 35
End: 2025-05-23
Payer: COMMERCIAL

## 2025-05-23 VITALS
RESPIRATION RATE: 18 BRPM | DIASTOLIC BLOOD PRESSURE: 70 MMHG | HEART RATE: 92 BPM | OXYGEN SATURATION: 99 % | TEMPERATURE: 97.8 F | HEIGHT: 64 IN | SYSTOLIC BLOOD PRESSURE: 112 MMHG | WEIGHT: 139 LBS | BODY MASS INDEX: 23.73 KG/M2

## 2025-05-23 DIAGNOSIS — Z13.21 ENCOUNTER FOR VITAMIN DEFICIENCY SCREENING: ICD-10-CM

## 2025-05-23 DIAGNOSIS — F34.1 PERSISTENT DEPRESSIVE DISORDER: Primary | ICD-10-CM

## 2025-05-23 DIAGNOSIS — Z13.6 SCREENING FOR CARDIOVASCULAR CONDITION: ICD-10-CM

## 2025-05-23 DIAGNOSIS — T78.40XD ALLERGIC REACTION, SUBSEQUENT ENCOUNTER: ICD-10-CM

## 2025-05-23 DIAGNOSIS — Z13.29 SCREENING FOR THYROID DISORDER: ICD-10-CM

## 2025-05-23 PROCEDURE — 99213 OFFICE O/P EST LOW 20 MIN: CPT | Performed by: NURSE PRACTITIONER

## 2025-05-23 RX ORDER — ESCITALOPRAM OXALATE 10 MG/1
10 TABLET ORAL DAILY
Qty: 30 TABLET | Refills: 5 | Status: SHIPPED | OUTPATIENT
Start: 2025-05-23 | End: 2025-11-19

## 2025-05-23 NOTE — PROGRESS NOTES
"Name: Jennifer Ryan      : 1990      MRN: [id number]  Encounter Provider: JENNY Ellis  Encounter Date: 2025   Encounter department: [facility]  :  Assessment & Plan  Persistent depressive disorder  Will check labs and plan to restart Lexapro.  Reviewed potential side effects and how this is taken.  RTO 6 weeks for f/u/CPE  Orders:  •  Vitamin B12; Future  •  Vitamin D 25 hydroxy; Future  •  escitalopram (LEXAPRO) 10 mg tablet; Take 1 tablet (10 mg total) by mouth daily    Screening for thyroid disorder    Orders:  •  TSH, 3rd generation with Free T4 reflex; Future    Screening for cardiovascular condition    Orders:  •  CBC and differential; Future  •  Comprehensive metabolic panel; Future  •  Lipid Panel with Direct LDL reflex; Future    Encounter for vitamin deficiency screening    Orders:  •  Vitamin B12; Future  •  Vitamin D 25 hydroxy; Future    Allergic reaction, subsequent encounter  Labs ordered  Orders:  •  Nuts panel IgE; Future        He had a recently at     History of Present Illness   Here today to establish care.  She longstanding history of depression and anxiety and would like to discuss treatment options.  She has done talk therapy at some point in the past, and was prescribed Lexapro, however she only took this for 1 month.    She reports that she did not.  Has noticed lip tingling sensation with both peanuts and cashews in the past.  Has also broken out in hives.  She tries to avoid exposure   Mouth burning with black pepper.   Reports intermittent headaches and dizziness, has not looked for any triggers or patterns        Review of Systems   Constitutional: Negative.    HENT:          See HPI   Respiratory: Negative.     Cardiovascular: Negative.    Gastrointestinal: Negative.    Psychiatric/Behavioral:          See HPI   All other systems reviewed and are negative.      Objective   /70   Pulse 92   Temp 97.8 °F (36.6 °C)   Resp 18   Ht 5' 4\" " (1.626 m)   Wt 63 kg (139 lb)   SpO2 99%   BMI 23.86 kg/m²      Physical Exam  Vitals and nursing note reviewed.   Constitutional:       General: She is not in acute distress.     Appearance: Normal appearance.   HENT:      Head: Normocephalic and atraumatic.     Eyes:      Conjunctiva/sclera: Conjunctivae normal.     Neck:      Vascular: No carotid bruit.     Cardiovascular:      Rate and Rhythm: Normal rate and regular rhythm.      Pulses: Normal pulses.      Heart sounds: Normal heart sounds. No murmur heard.  Pulmonary:      Effort: Pulmonary effort is normal.      Breath sounds: Normal breath sounds.     Skin:     General: Skin is warm and dry.     Neurological:      Mental Status: She is alert.     Psychiatric:         Mood and Affect: Mood normal.         Behavior: Behavior normal.

## 2025-05-29 ENCOUNTER — APPOINTMENT (OUTPATIENT)
Dept: LAB | Facility: CLINIC | Age: 35
End: 2025-05-29
Attending: PATHOLOGY

## 2025-05-29 DIAGNOSIS — Z00.6 ENCOUNTER FOR EXAMINATION FOR NORMAL COMPARISON OR CONTROL IN CLINICAL RESEARCH PROGRAM: ICD-10-CM

## 2025-05-29 PROCEDURE — 36415 COLL VENOUS BLD VENIPUNCTURE: CPT

## 2025-06-10 LAB
APOB+LDLR+PCSK9 GENE MUT ANL BLD/T: NOT DETECTED
BRCA1+BRCA2 DEL+DUP + FULL MUT ANL BLD/T: NOT DETECTED
MLH1+MSH2+MSH6+PMS2 GN DEL+DUP+FUL M: NOT DETECTED

## 2025-07-11 ENCOUNTER — OFFICE VISIT (OUTPATIENT)
Dept: URGENT CARE | Facility: CLINIC | Age: 35
End: 2025-07-11
Payer: COMMERCIAL

## 2025-07-11 VITALS
SYSTOLIC BLOOD PRESSURE: 103 MMHG | RESPIRATION RATE: 20 BRPM | WEIGHT: 138 LBS | TEMPERATURE: 97.6 F | BODY MASS INDEX: 23.56 KG/M2 | OXYGEN SATURATION: 99 % | HEART RATE: 92 BPM | DIASTOLIC BLOOD PRESSURE: 73 MMHG | HEIGHT: 64 IN

## 2025-07-11 DIAGNOSIS — T63.441A ALLERGIC REACTION TO BEE STING: Primary | ICD-10-CM

## 2025-07-11 PROCEDURE — 99213 OFFICE O/P EST LOW 20 MIN: CPT

## 2025-07-11 RX ORDER — PREDNISONE 20 MG/1
40 TABLET ORAL DAILY
Qty: 10 TABLET | Refills: 0 | Status: SHIPPED | OUTPATIENT
Start: 2025-07-11 | End: 2025-07-16

## 2025-07-11 RX ORDER — CEPHALEXIN 500 MG/1
500 CAPSULE ORAL EVERY 6 HOURS SCHEDULED
Qty: 20 CAPSULE | Refills: 0 | Status: SHIPPED | OUTPATIENT
Start: 2025-07-11 | End: 2025-07-16

## 2025-07-11 RX ORDER — EPINEPHRINE 0.3 MG/.3ML
0.3 INJECTION SUBCUTANEOUS ONCE
Qty: 0.6 ML | Refills: 0 | Status: SHIPPED | OUTPATIENT
Start: 2025-07-11 | End: 2025-07-11

## 2025-07-11 NOTE — PROGRESS NOTES
[facility] Now  Name: Jennifer Ryan      : 1990      MRN: [id number]  Encounter Provider: Tiff Cervantes PA-C  Encounter Date: 2025   Encounter department: [facility] [place]  :  Assessment & Plan  Allergic reaction to bee sting    Orders:    predniSONE 20 mg tablet; Take 2 tablets (40 mg total) by mouth daily for 5 days    cephalexin (KEFLEX) 500 mg capsule; Take 1 capsule (500 mg total) by mouth every 6 (six) hours for 5 days    EPINEPHrine (EPIPEN) 0.3 mg/0.3 mL SOAJ; Inject 0.3 mL (0.3 mg total) into a muscle once for 1 dose    First time being stung by a bee. Had itching throat and face after but no swelling or SOB. Family history of bee allergy. Will start on prednisone. If not improving given antibiotic to start due to possibility of cellulitis. Epipen given due to family history of allergy and response to first sting.     Patient Instructions  Follow up with PCP in 3-5 days.  Proceed to  ER if symptoms worsen.    If tests are performed, our office will contact you with results only if changes need to made to the care plan discussed with you at the visit. You can review your full results on [facility].    Chief Complaint:   Chief Complaint   Patient presents with    Insect Bite     Bee sting on left foot x 2 days is red and swollen warm to the touch     History of Present Illness   Bee sting to left foot 2 days ago. Has had worsening erythema and swelling to the site since.  Reports that at the time of sting, had itchiness to her face and throat but denied any swelling or shortness of breath.  Of note she does have a family history of bee sting allergies, her dad is highly allergic.  This is her first ever bee sting    Insect Bite  The current episode started in the past 7 days. The problem occurs constantly. The problem has been gradually worsening. Pertinent negatives include no abdominal pain, chest pain, chills, congestion, coughing, fever, headaches, myalgias,  "nausea, sore throat or vomiting.         Review of Systems   Constitutional:  Negative for chills and fever.   HENT:  Negative for congestion, postnasal drip, rhinorrhea, sinus pressure, sore throat and trouble swallowing.    Respiratory:  Negative for cough, chest tightness and shortness of breath.    Cardiovascular:  Negative for chest pain and palpitations.   Gastrointestinal:  Negative for abdominal pain, nausea and vomiting.   Genitourinary:  Negative for difficulty urinating.   Musculoskeletal:  Negative for myalgias.   Skin:  Positive for color change.   Neurological:  Negative for dizziness and headaches.     Past Medical History   Past Medical History[1]  Past Surgical History[2]  Family History[3]  she reports that she has never smoked. She has never been exposed to tobacco smoke. She has never used smokeless tobacco. She reports that she does not currently use alcohol. She reports that she does not use drugs.  Current Outpatient Medications   Medication Instructions    cephalexin (KEFLEX) 500 mg, Oral, Every 6 hours scheduled    EPINEPHrine (EPIPEN) 0.3 mg, Intramuscular, Once    escitalopram (LEXAPRO) 10 mg, Oral, Daily    predniSONE 40 mg, Oral, Daily   Allergies[4]     Objective   /73   Pulse 92   Temp 97.6 °F (36.4 °C)   Resp 20   Ht 5' 4\" (1.626 m)   Wt 62.6 kg (138 lb)   LMP 07/01/2025 (Approximate)   SpO2 99%   BMI 23.69 kg/m²      Physical Exam  Constitutional:       Appearance: Normal appearance.   HENT:      Head: Normocephalic.      Nose: Nose normal.      Mouth/Throat:      Mouth: Mucous membranes are moist.      Pharynx: Oropharynx is clear.     Eyes:      Pupils: Pupils are equal, round, and reactive to light.       Cardiovascular:      Pulses: Normal pulses.   Pulmonary:      Effort: Pulmonary effort is normal. No respiratory distress.      Breath sounds: Normal breath sounds. No stridor. No wheezing, rhonchi or rales.   Abdominal:      General: Abdomen is flat.      " "Palpations: Abdomen is soft.     Musculoskeletal:         General: Normal range of motion.      Cervical back: Normal range of motion.     Skin:     General: Skin is warm and dry.      Capillary Refill: Capillary refill takes less than 2 seconds.      Comments: Bee sting to base of inner left foot. Surrounding erythema, swelling, and tenderness. Hot to touch.  Spreading up foot and ankle     Neurological:      General: No focal deficit present.      Mental Status: She is alert and oriented to person, place, and time. Mental status is at baseline.         Portions of the record may have been created with voice recognition software.  Occasional wrong word or \"sound a like\" substitutions may have occurred due to the inherent limitations of voice recognition software.  Read the chart carefully and recognize, using context, where substitutions have occurred.         [1]   Past Medical History:  Diagnosis Date    Migraine     Varicella , 10 years old   [2]   Past Surgical History:  Procedure Laterality Date     SECTION N/A     TUBAL LIGATION     [3]   Family History  Problem Relation Name Age of Onset    Cancer Mother Jennifer Mulvihill         Hodgkin's Lymphoma    Heart disease Mother Jennifer Mulvihill         Dilated cardiomyopathy, SVT,  VT with syncope    Migraines Mother Jennifer Mulvihill     Stroke Mother Jennifer Mulvihill         CVA, left frontal gyrus    Supraventricular tachycardia Mother Jennifer Mulvihill     Brain Aneurysm Mother Jennifer Mulvihill     Hamida-Danlos syndrome Mother Jennifer Mulvihill         negative vascular type    Raynaud syndrome Mother Jennifer Mulvihill     Hypertension Father Bakari     Hyperlipidemia Father Bakari     Asthma Sister Rose Vargas     Migraines Sister Rose Vargas     Other (Other) Brother Pedro         cyst disorder    Asthma Son Pablo Ryan     Rashes / Skin problems Son Pablo Ryan     Seizures Son Pablo Ryan     Autism Son Scott     Cerebral " aneurysm Maternal Grandmother      Prostate cancer Maternal Grandfather      No Known Problems Paternal Grandmother     [4] No Known Allergies